# Patient Record
Sex: FEMALE | URBAN - METROPOLITAN AREA
[De-identification: names, ages, dates, MRNs, and addresses within clinical notes are randomized per-mention and may not be internally consistent; named-entity substitution may affect disease eponyms.]

---

## 2019-12-07 ENCOUNTER — EMERGENCY (EMERGENCY)
Facility: HOSPITAL | Age: 24
LOS: 1 days | Discharge: HOME | End: 2019-12-07
Admitting: EMERGENCY MEDICINE
Payer: SELF-PAY

## 2019-12-07 VITALS
SYSTOLIC BLOOD PRESSURE: 120 MMHG | DIASTOLIC BLOOD PRESSURE: 81 MMHG | HEIGHT: 66 IN | TEMPERATURE: 98 F | OXYGEN SATURATION: 96 % | WEIGHT: 179.9 LBS | HEART RATE: 99 BPM | RESPIRATION RATE: 15 BRPM

## 2019-12-07 PROCEDURE — 99284 EMERGENCY DEPT VISIT MOD MDM: CPT
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
## 2019-12-07 NOTE — ED PROVIDER NOTE - PHYSICAL EXAMINATION
Gen - WDWN F, NAD, comfortable and non-toxic appearing  Skin - warm, dry, intact   HEENT - AT/NC, airway patent, neck supple   CV - S1S2, R/R/R  Resp - CTAB, no r/r/w  GI - soft, ND, NT, no CVAT b/l   MS - w/w/p, no c/c/e  Neuro - AxOx3, clear speech, no focal neuro deficits, ambulatory without gait disturbance
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
## 2019-12-07 NOTE — ED ADULT NURSE NOTE - CHPI ED NUR SYMPTOMS NEG
no change in level of consciousness/no loss of consciousness/no confusion/no weakness/no blurred vision/no vomiting/no dizziness/no nausea/no numbness/no fever
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
## 2019-12-14 DIAGNOSIS — F10.129 ALCOHOL ABUSE WITH INTOXICATION, UNSPECIFIED: ICD-10-CM

## 2019-12-14 DIAGNOSIS — R41.82 ALTERED MENTAL STATUS, UNSPECIFIED: ICD-10-CM

## 2023-04-13 NOTE — ED PROVIDER NOTE - PATIENT PORTAL LINK FT
Date of Service: 04/12/2023    CHIEF COMPLAINT:    Left ring finger locking.  Left small finger numbness.    The patient is seen in consultation at the request of Johnny Duffy MD.    HISTORY OF PRESENT ILLNESS:    The patient is a 59-year-old female with clicking, catching, and locking to her left ring finger.  She occasionally has to use her right hand to unlock the ring finger. It is painful to bend and straighten with locking and discomfort.    Additionally, she states that she had an ulnar nerve surgery on her left elbow by Dr. Dhiraj Alvarado perhaps 2 years ago.  She states that she had constant presurgical numbness to her left small finger, which has persisted.  She states that after her ulnar nerve surgery on her left elbow, her pain to her forearm and hand got better but the numbness has persisted, which is essentially unchanged from preoperatively.    The patient's past medical, surgical, social and family history have been reviewed today and have been updated in the patient's medical record.    PHYSICAL EXAMINATION:  GENERAL: The patient is alert and oriented x3. The patient is well appearing. The patient is in no acute distress.   SKIN: The skin of bilateral upper extremities is pink, warm, and well perfused, without rash or striae.   VASCULAR: Bilateral upper extremities reveal radial pulses of normal quality. All of the fingertips are pink, warm, and well perfused.    EXTREMITIES:  Left hand reveals clicking, catching, triggering and pain along the left ring finger A1 pulley.  There is overt triggering when she makes a tight fist. No extensor tendon subluxation.  She has a 4.5 cm incision over the ulnar aspect of her left elbow.  No sensory disturbances about the incision.  Range of motion of her elbow is full.  There is mild \"perching\" of her ulnar nerves during elbow flexion.  No gross subluxation.  She has no intrinsic atrophy.  She has static numbness to the small finger. Tinel's at the  elbow is unremarkable.    IMPRESSION:    1.  Left ring trigger finger.  2.  Cubital tunnel syndrome, left elbow.      We discussed trigger finger. Pictures were shown discussing the pathoanatomy of the condition. We discussed observation, Cortisone injection as well as surgery.      We discussed cubital tunnel syndrome extensively. We discussed that with treatment of this nerve compression, incomplete improvement in strength, coordination and sensation are expected, even with surgery. We discussed splinting the elbow, relatively extended at bedtime, and instructions were given on how to do this. We also discussed avoiding hyperflexion of the elbow during the day and avoiding direct pressure on the ulnar nerve. We also discussed surgery for the problem. The primary goal of surgery is to minimize progression of the condition. Often times pain, burning and the discomfort are improved.      In regards to her ring finger, she would like try a Cortisone shot.    INJECTION CONSENT:  The risks and the benefits of the injection were explained to the patient including but not limited to infection; increase in pain; damage to nerves, arteries or tendons; swelling; thinning of the skin or bruising, as well as skin discoloration. The patient is aware of these risks and the benefits of the injection and is willing to proceed.      Under sterile techniques, the left ring finger flexor sheath at the A1 pulley was sterilely prepped and injected with 5 mg of Dexamethasone, 0.5 mL of 1% Lidocaine plain.  She tolerated the shot well.    In regards to her cubital tunnel syndrome, we discussed the expected outcome of surgery. Oftentimes the static numbness will be unchanged after surgery.  Typically pain improves and the condition will not worsen, which is about what the patient's clinical experience has been.  I discussed repeating an EMG looking for any worsening of her nerve function per the EMG, and repeat ulnar nerve surgery is very  unpredictable, but as mentioned, her symptoms are somewhat predictable after her surgery.  She would like to observe this.      She will see me back in 4-6 weeks or as needed.    Thank you, Dr. Johnny Duffy, for allowing me to care for your patient.      Dictated By: Trung Chapa DO  Signing Provider: Trung Chapa DO    DPW/mts ([id number])   DD: 04/12/2023 2:16:35 PM TD: 04/13/2023 11:30:52 AM      Copy Sent To:  Johnny Duffy MD   You can access the FollowMyHealth Patient Portal offered by [facility] by registering at the following website: [email or web address]. By joining [employer]’s FollowMyHealth portal, you will also be able to view your health information using other applications (apps) compatible with our system.